# Patient Record
Sex: MALE | URBAN - METROPOLITAN AREA
[De-identification: names, ages, dates, MRNs, and addresses within clinical notes are randomized per-mention and may not be internally consistent; named-entity substitution may affect disease eponyms.]

---

## 2024-02-07 ENCOUNTER — OV NP (OUTPATIENT)
Dept: URBAN - METROPOLITAN AREA CLINIC 128 | Facility: CLINIC | Age: 44
End: 2024-02-07
Payer: COMMERCIAL

## 2024-02-07 VITALS
DIASTOLIC BLOOD PRESSURE: 78 MMHG | HEIGHT: 74 IN | SYSTOLIC BLOOD PRESSURE: 120 MMHG | BODY MASS INDEX: 29.93 KG/M2 | TEMPERATURE: 97.5 F | WEIGHT: 233.2 LBS

## 2024-02-07 DIAGNOSIS — K57.92 DIVERTICULITIS: ICD-10-CM

## 2024-02-07 DIAGNOSIS — R79.89 ELEVATED LIVER FUNCTION TESTS: ICD-10-CM

## 2024-02-07 DIAGNOSIS — R10.9 ABDOMINAL PAIN: ICD-10-CM

## 2024-02-07 DIAGNOSIS — R91.8 PULMONARY NODULES: ICD-10-CM

## 2024-02-07 DIAGNOSIS — K40.90 RIGHT INGUINAL HERNIA: ICD-10-CM

## 2024-02-07 PROBLEM — 307496006: Status: ACTIVE | Noted: 2024-02-07

## 2024-02-07 PROCEDURE — 99204 OFFICE O/P NEW MOD 45 MIN: CPT | Performed by: PHYSICIAN ASSISTANT

## 2024-02-07 RX ORDER — ONDANSETRON HYDROCHLORIDE 4 MG/1
1 TABLET TABLET, FILM COATED ORAL ONCE A DAY
Status: ACTIVE | COMMUNITY

## 2024-02-07 RX ORDER — OXYCODONE HYDROCHLORIDE AND ACETAMINOPHEN 5; 325 MG/1; MG/1
1 TABLET AS NEEDED TABLET ORAL
Status: ACTIVE | COMMUNITY

## 2024-02-07 RX ORDER — POLYETHYLENE GLYCOL 3350, SODIUM SULFATE ANHYDROUS, SODIUM BICARBONATE, SODIUM CHLORIDE, POTASSIUM CHLORIDE 236; 22.74; 6.74; 5.86; 2.97 G/4L; G/4L; G/4L; G/4L; G/4L
AS DIRECTED POWDER, FOR SOLUTION ORAL ONCE A DAY
Qty: 1 BOTTLE | Refills: 0 | OUTPATIENT
Start: 2024-02-07 | End: 2024-02-08

## 2024-02-07 RX ORDER — LISINOPRIL 20 MG/1
1 TABLET TABLET ORAL ONCE A DAY
Status: ACTIVE | COMMUNITY

## 2024-02-07 RX ORDER — SIMVASTATIN 20 MG
1 TABLET IN THE EVENING TABLET ORAL ONCE A DAY
Status: ACTIVE | COMMUNITY

## 2024-02-07 RX ORDER — AMOXICILLIN AND CLAVULANATE POTASSIUM 875; 125 MG/1; MG/1
1 TABLET TABLET, FILM COATED ORAL
Status: ACTIVE | COMMUNITY

## 2024-02-07 NOTE — HPI-TODAY'S VISIT:
The patient elicits having abdominal pain that brought her to [facility]- ED on 2/1/24. WBC 14.6, total bili 1.2, alk phos 68,  AST 33, , lipase 17. Abdominal and pelvic CT scan revealed wall thickening and adjacent inflammatory change at the distal descending colon and proximal sigmoid colon, colonic diverticula are present in this region and this is concerning for diverticulitis, non-obstructing right renal calculus, bilateral pulmonary nodules measuring up to 0.5cm, if low risk no follow up visit needed, if patient is high risj for lung cancer, consider follow up chest CT scan without contrast in 12 months. It also revealed a small fat containing right inguinal hernia and questionable tortuous vessels/varicocele within the right inguinal canal. He has had a cholecystectomy and a vasectomy. Associated symptoms: nothing Severity/ Pain scale: mild What alleviates the symptoms: nothing What aggravates the symptoms: nothing Any recent weight changes: none Any recent medication changes: none Any recent dietary changes: none Previous work-up- labs,imaging, scopes: Last colonoscopy: as a teenager Denies a family history of colon polyps. His paternal grandfather had colon cancer Denies any heart, lung, or kidney problems.

## 2024-02-07 NOTE — PHYSICAL EXAM GASTROINTESTINAL
Abdomen , soft, tenderness to palpation in the LLQ, nondistended , no guarding or rigidity , no masses palpable , normal bowel sounds, old surgical scars noted, negative psoas and obturators signs, negative CVA tenderness bilaterally Liver and Spleen , no hepatosplenomegaly Rectal deferred

## 2024-02-13 ENCOUNTER — COLON (OUTPATIENT)
Dept: URBAN - METROPOLITAN AREA SURGERY CENTER 31 | Facility: SURGERY CENTER | Age: 44
End: 2024-02-13
Payer: COMMERCIAL

## 2024-02-13 DIAGNOSIS — Z87.19 ESOPHAGEAL FOOD BOLUS: ICD-10-CM

## 2024-02-13 DIAGNOSIS — K57.30 ACQUIRED DIVERTICULOSIS OF COLON: ICD-10-CM

## 2024-02-13 PROCEDURE — G8907 PT DOC NO EVENTS ON DISCHARG: HCPCS | Performed by: INTERNAL MEDICINE

## 2024-02-13 PROCEDURE — 45378 DIAGNOSTIC COLONOSCOPY: CPT | Performed by: INTERNAL MEDICINE

## 2024-02-14 LAB
ACTIN (SMOOTH MUSCLE) ANTIBODY (IGG): <20
ALBUMIN/GLOBULIN RATIO: 1.7
ALBUMIN: 4.6
ALKALINE PHOSPHATASE: 61
ALPHA-1-ANTITRYPSIN QN: 149
ALT (SGPT): 48
ANA SCREEN, IFA: NEGATIVE
AST (SGOT): 27
BILIRUBIN, DIRECT: 0.2
BILIRUBIN, INDIRECT: 0.5
BILIRUBIN, TOTAL: 0.7
CERULOPLASMIN: 29
FERRITIN, SERUM: 271
GGT: 39
GLOBULIN: 2.7
HBSAG SCREEN: (no result)
HEP A AB, IGM: (no result)
HEP B CORE AB, IGM: (no result)
HEPATITIS C ANTIBODY: (no result)
INR: 1
IRON BIND.CAP.(TIBC): 328
IRON SATURATION: 27
IRON: 90
MITOCHONDRIAL (M2) ANTIBODY: <=20
PROTEIN, TOTAL: 7.3
PT: 10.9
RHEUMATOID FACTOR: <14
SJOGREN'S ANTIBODY (SS-A): (no result)
SJOGREN'S ANTIBODY (SS-B): (no result)

## 2024-03-14 ENCOUNTER — OV EP (OUTPATIENT)
Dept: URBAN - METROPOLITAN AREA CLINIC 128 | Facility: CLINIC | Age: 44
End: 2024-03-14

## 2024-03-26 ENCOUNTER — OV EP (OUTPATIENT)
Dept: URBAN - METROPOLITAN AREA CLINIC 128 | Facility: CLINIC | Age: 44
End: 2024-03-26
Payer: COMMERCIAL

## 2024-03-26 VITALS
TEMPERATURE: 97.3 F | BODY MASS INDEX: 29.11 KG/M2 | HEART RATE: 79 BPM | DIASTOLIC BLOOD PRESSURE: 80 MMHG | WEIGHT: 226.8 LBS | HEIGHT: 74 IN | SYSTOLIC BLOOD PRESSURE: 136 MMHG

## 2024-03-26 DIAGNOSIS — K57.92 DIVERTICULITIS: ICD-10-CM

## 2024-03-26 DIAGNOSIS — R91.8 PULMONARY NODULES: ICD-10-CM

## 2024-03-26 DIAGNOSIS — R10.9 ABDOMINAL PAIN: ICD-10-CM

## 2024-03-26 DIAGNOSIS — R79.89 ELEVATED LIVER FUNCTION TESTS: ICD-10-CM

## 2024-03-26 DIAGNOSIS — K40.90 RIGHT INGUINAL HERNIA: ICD-10-CM

## 2024-03-26 PROCEDURE — 99214 OFFICE O/P EST MOD 30 MIN: CPT | Performed by: PHYSICIAN ASSISTANT

## 2024-03-26 RX ORDER — SIMVASTATIN 20 MG
1 TABLET IN THE EVENING TABLET ORAL ONCE A DAY
Status: ACTIVE | COMMUNITY

## 2024-03-26 RX ORDER — LISINOPRIL 20 MG/1
1 TABLET TABLET ORAL ONCE A DAY
Status: ACTIVE | COMMUNITY

## 2024-03-26 RX ORDER — ONDANSETRON HYDROCHLORIDE 4 MG/1
1 TABLET TABLET, FILM COATED ORAL ONCE A DAY
Status: ON HOLD | COMMUNITY

## 2024-03-26 RX ORDER — AMOXICILLIN AND CLAVULANATE POTASSIUM 875; 125 MG/1; MG/1
1 TABLET TABLET, FILM COATED ORAL
Status: ON HOLD | COMMUNITY

## 2024-03-26 NOTE — HPI-TODAY'S VISIT:
Please see home blood pressure readings & advise.   The patient is here for a follow up visit. He offers no GI symptoms. Previously, he elicits having had abdominal pain that brought him to [facility]- ED on 2/1/24. WBC 14.6, total bili 1.2, alk phos 68,  AST 33, , lipase 17. Abdominal and pelvic CT scan revealed wall thickening and adjacent inflammatory change at the distal descending colon and proximal sigmoid colon, colonic diverticula are present in this region and this is concerning for diverticulitis, non-obstructing right renal calculus, bilateral pulmonary nodules measuring up to 0.5cm, if low risk no follow up visit needed, if patient is high risj for lung cancer, consider follow up chest CT scan without contrast in 12 months. It also revealed a small fat containing right inguinal hernia and questionable tortuous vessels/varicocele within the right inguinal canal. He had a right inguinal hernia repair 2 weeks ago with Dr. Tien Lowry and feels fine overall from this. His post-operative follow up is in 3 days. He has had a cholecystectomy and a vasectomy. Associated symptoms: nothing Severity/ Pain scale: mild What alleviates the symptoms: nothing What aggravates the symptoms: nothing Any recent weight changes: none Any recent medication changes: none Any recent dietary changes: none Previous work-up- labs,imaging, scopes: Prior colonoscopy: as a teenager 2024 colonoscopy was normal, no specimens were collected, advised to repeat it in 10 years Denies a family history of colon polyps. His paternal grandfather had colon cancer Denies any heart, lung, or kidney problems. His recent labs revelaed an ALT of 48, rest of LFTs were normal.

## 2024-06-25 ENCOUNTER — OFFICE VISIT (OUTPATIENT)
Dept: URBAN - METROPOLITAN AREA CLINIC 128 | Facility: CLINIC | Age: 44
End: 2024-06-25
Payer: COMMERCIAL

## 2024-06-25 ENCOUNTER — DASHBOARD ENCOUNTERS (OUTPATIENT)
Age: 44
End: 2024-06-25

## 2024-06-25 VITALS
DIASTOLIC BLOOD PRESSURE: 90 MMHG | WEIGHT: 226.2 LBS | BODY MASS INDEX: 29.03 KG/M2 | HEIGHT: 74 IN | HEART RATE: 80 BPM | TEMPERATURE: 97.7 F | SYSTOLIC BLOOD PRESSURE: 146 MMHG

## 2024-06-25 DIAGNOSIS — R79.89 ELEVATED LIVER FUNCTION TESTS: ICD-10-CM

## 2024-06-25 DIAGNOSIS — R10.9 ABDOMINAL PAIN: ICD-10-CM

## 2024-06-25 DIAGNOSIS — K40.90 RIGHT INGUINAL HERNIA: ICD-10-CM

## 2024-06-25 DIAGNOSIS — K57.92 DIVERTICULITIS: ICD-10-CM

## 2024-06-25 PROCEDURE — 99213 OFFICE O/P EST LOW 20 MIN: CPT | Performed by: PHYSICIAN ASSISTANT

## 2024-06-25 RX ORDER — LISINOPRIL 20 MG/1
1 TABLET TABLET ORAL ONCE A DAY
Status: ACTIVE | COMMUNITY

## 2024-06-25 RX ORDER — ONDANSETRON HYDROCHLORIDE 4 MG/1
1 TABLET TABLET, FILM COATED ORAL ONCE A DAY
Status: ON HOLD | COMMUNITY

## 2024-06-25 RX ORDER — AMOXICILLIN AND CLAVULANATE POTASSIUM 875; 125 MG/1; MG/1
1 TABLET TABLET, FILM COATED ORAL
Status: ON HOLD | COMMUNITY

## 2024-06-25 RX ORDER — SIMVASTATIN 20 MG
1 TABLET IN THE EVENING TABLET ORAL ONCE A DAY
Status: ACTIVE | COMMUNITY

## 2024-06-25 NOTE — HPI-TODAY'S VISIT:
The patient is here for a follow up visit. He offers no GI symptoms. Previously, he elicits having had abdominal pain that brought him to [facility] ED on 2/1/24. WBC 14.6, total bili 1.2, alk phos 68,  AST 33, , lipase 17. Abdominal and pelvic CT scan revealed wall thickening and adjacent inflammatory change at the distal descending colon and proximal sigmoid colon, colonic diverticula are present in this region and this is concerning for diverticulitis, non-obstructing right renal calculus, bilateral pulmonary nodules measuring up to 0.5cm, if low risk no follow up visit needed, if patient is high risk for lung cancer, consider follow up chest CT scan without contrast in 12 months. It also revealed a small fat containing right inguinal hernia and questionable tortuous vessels/varicocele within the right inguinal canal. He had a right inguinal hernia repair 12 weeks ago with Dr. Tien Lowry and feels fine overall from this.  His PCP states his pulmonary nodule is chronic anc stable and not growing in size He has had a cholecystectomy and a vasectomy. Associated symptoms: nothing Severity/ Pain scale: mild What alleviates the symptoms: nothing What aggravates the symptoms: nothing Any recent weight changes: none Any recent medication changes: none Any recent dietary changes: none Previous work-up- labs,imaging, scopes: Prior colonoscopy: as a teenager 2024 colonoscopy was normal, no specimens were collected, advised to repeat it in 10 years Denies a family history of colon polyps. His paternal grandfather had colon cancer Denies any heart, lung, or kidney problems. His prior labs revealed an AST of 48 but now all of his labs are normal. Normal LFTs now.  2024 colonoscopy was normal, no specimens were collected.

## 2024-06-25 NOTE — PHYSICAL EXAM CONSTITUTIONAL:
well developed, well nourished , in no acute distress , ambulating without difficulty , normal communication ability
Dr. Hernadez

## 2024-06-25 NOTE — PHYSICAL EXAM GASTROINTESTINAL
Abdomen , soft, nontender, nondistended , no guarding or rigidity , no masses palpable , normal bowel sounds, healing surgical scars noted Liver and Spleen , no hepatosplenomegaly Rectal deferred